# Patient Record
Sex: MALE | Race: WHITE | Employment: FULL TIME | URBAN - NONMETROPOLITAN AREA
[De-identification: names, ages, dates, MRNs, and addresses within clinical notes are randomized per-mention and may not be internally consistent; named-entity substitution may affect disease eponyms.]

---

## 2020-08-07 ENCOUNTER — HOSPITAL ENCOUNTER (EMERGENCY)
Age: 20
Discharge: HOME | End: 2020-08-08
Attending: EMERGENCY MEDICINE
Payer: COMMERCIAL

## 2020-08-07 VITALS
TEMPERATURE: 99.7 F | WEIGHT: 190 LBS | DIASTOLIC BLOOD PRESSURE: 96 MMHG | BODY MASS INDEX: 32.44 KG/M2 | OXYGEN SATURATION: 98 % | HEIGHT: 64 IN | RESPIRATION RATE: 17 BRPM | SYSTOLIC BLOOD PRESSURE: 150 MMHG | HEART RATE: 114 BPM

## 2020-08-07 PROCEDURE — 99283 EMERGENCY DEPT VISIT LOW MDM: CPT

## 2020-08-07 ASSESSMENT — PAIN DESCRIPTION - PAIN TYPE: TYPE: ACUTE PAIN

## 2020-08-07 ASSESSMENT — PAIN DESCRIPTION - PROGRESSION: CLINICAL_PROGRESSION: NOT CHANGED

## 2020-08-07 ASSESSMENT — PAIN DESCRIPTION - LOCATION: LOCATION: ARM

## 2020-08-07 ASSESSMENT — PAIN DESCRIPTION - DESCRIPTORS: DESCRIPTORS: BURNING

## 2020-08-07 ASSESSMENT — PAIN DESCRIPTION - ONSET: ONSET: ON-GOING

## 2020-08-07 ASSESSMENT — PAIN SCALES - GENERAL: PAINLEVEL_OUTOF10: 4

## 2020-08-07 ASSESSMENT — PAIN DESCRIPTION - FREQUENCY: FREQUENCY: CONTINUOUS

## 2020-08-07 ASSESSMENT — PAIN DESCRIPTION - ORIENTATION: ORIENTATION: RIGHT;LOWER

## 2020-08-08 PROCEDURE — 6360000002 HC RX W HCPCS: Performed by: EMERGENCY MEDICINE

## 2020-08-08 PROCEDURE — 90471 IMMUNIZATION ADMIN: CPT | Performed by: EMERGENCY MEDICINE

## 2020-08-08 PROCEDURE — 90715 TDAP VACCINE 7 YRS/> IM: CPT | Performed by: EMERGENCY MEDICINE

## 2020-08-08 RX ORDER — BACITRACIN, NEOMYCIN, POLYMYXIN B 400; 3.5; 5 [USP'U]/G; MG/G; [USP'U]/G
OINTMENT TOPICAL
Status: DISCONTINUED
Start: 2020-08-08 | End: 2020-08-08 | Stop reason: HOSPADM

## 2020-08-08 RX ADMIN — TETANUS TOXOID, REDUCED DIPHTHERIA TOXOID AND ACELLULAR PERTUSSIS VACCINE, ADSORBED 0.5 ML: 5; 2.5; 8; 8; 2.5 SUSPENSION INTRAMUSCULAR at 00:01

## 2020-08-08 NOTE — ED PROVIDER NOTES
Ronak Tapia 13 COMPLAINT       Chief Complaint   Patient presents with    Burn     Right forearm       Nurses Notes reviewed and I agree except as noted in the HPI. HISTORY OF PRESENT ILLNESS    Coley Lefort is a 23 y.o. male. This patient is a  at [employer]. He dropped some chicken in the hot grease and it splattered resulting in a small burn on the right dorsal forearm about the size of a quarter. Appears to be a second-degree burn. There was a blister there that popped. No other injuries reported. REVIEW OF SYSTEMS         No fever, no chest pain, no shortness of breath    Remainder of review of systems is otherwise reviewed as negative. PAST MEDICAL HISTORY    has no past medical history on file. SURGICAL HISTORY      has a past surgical history that includes Tonsillectomy. CURRENT MEDICATIONS       Previous Medications    No medications on file       ALLERGIES     is allergic to penicillins. FAMILY HISTORY     has no family status information on file. family history is not on file. SOCIAL HISTORY      reports that he has quit smoking. His smoking use included cigarettes. He has never used smokeless tobacco. He reports current alcohol use. He reports previous drug use. Drug: Marijuana. PHYSICAL EXAM     INITIAL VITALS:  height is 5' 4\" (1.626 m) and weight is 190 lb (86.2 kg). His oral temperature is 99.7 °F (37.6 °C). His blood pressure is 150/96 (abnormal) and his pulse is 114. His respiration is 17 and oxygen saturation is 98%. Constitutional: Well appearing and non-toxic   HENT:  Atraumatic appearing  Neck- supple   Respiratory:  No wheezing  Cardiovascular: regular  Musculoskeletal: Small quarter shaped second-degree burn on the right mid forearm on the dorsal side. No bleeding. Good range of motion of all the fingers wrist elbow and shoulder on that side.   2 out of 4 radial and ulnar pulses. Intact sensation   Back- no tenderness        DIAGNOSTIC RESULTS         LABS:   Labs Reviewed - No data to display    EMERGENCY DEPARTMENT COURSE:   Vitals:    Vitals:    08/07/20 2320   BP: (!) 150/96   Pulse: 114   Resp: 17   Temp: 99.7 °F (37.6 °C)   TempSrc: Oral   SpO2: 98%   Weight: 190 lb (86.2 kg)   Height: 5' 4\" (1.626 m)     There a very small second-degree burn on the right forearm. Keep the area clean. Good wound care. Follow-up needed in 2 to 3 days. Tetanus shot given in the department because he did not think he was up-to-date.       CRITICAL CARE:   none         FINAL IMPRESSION    Second-degree burn to right forearm <1% BSA    DISPOSITION/PLAN   discharged    DISCHARGE MEDICATIONS:  New Prescriptions    No medications on file       (Please note that portions of this note were completed with a voice recognition program.  Efforts were made to edit the dictations but occasionally words are mis-transcribed.)    Latrice Chapa, [address] [place] [address] DO Ke  08/07/20 1237

## 2020-08-08 NOTE — ED TRIAGE NOTES
Pt to ED today via triage desk with c/o grease burn from work. Pt with burn to right posterior forearm approximately the size of a quarter. Pt reports current pain at 4/10. Pt states this happened at 2050. Pt states this will be filed under worker's comp.